# Patient Record
Sex: FEMALE | Race: WHITE | NOT HISPANIC OR LATINO
[De-identification: names, ages, dates, MRNs, and addresses within clinical notes are randomized per-mention and may not be internally consistent; named-entity substitution may affect disease eponyms.]

---

## 2018-11-13 ENCOUNTER — HEALTH MAINTENANCE LETTER (OUTPATIENT)
Age: 28
End: 2018-11-13

## 2018-12-26 ENCOUNTER — OFFICE VISIT - RIVER FALLS (OUTPATIENT)
Dept: FAMILY MEDICINE | Facility: CLINIC | Age: 28
End: 2018-12-26

## 2018-12-26 ASSESSMENT — MIFFLIN-ST. JEOR: SCORE: 1215.77

## 2019-03-19 ENCOUNTER — OFFICE VISIT - RIVER FALLS (OUTPATIENT)
Dept: FAMILY MEDICINE | Facility: CLINIC | Age: 29
End: 2019-03-19

## 2019-03-19 LAB — DEPRECATED S PYO AG THROAT QL EIA: POSITIVE

## 2019-03-19 ASSESSMENT — MIFFLIN-ST. JEOR: SCORE: 1220.3

## 2019-07-24 ENCOUNTER — OFFICE VISIT - RIVER FALLS (OUTPATIENT)
Dept: FAMILY MEDICINE | Facility: CLINIC | Age: 29
End: 2019-07-24

## 2019-07-24 LAB — DEPRECATED S PYO AG THROAT QL EIA: DETECTED

## 2019-07-24 ASSESSMENT — MIFFLIN-ST. JEOR: SCORE: 1212.14

## 2019-10-01 ENCOUNTER — OFFICE VISIT - RIVER FALLS (OUTPATIENT)
Dept: FAMILY MEDICINE | Facility: CLINIC | Age: 29
End: 2019-10-01

## 2019-10-01 ASSESSMENT — MIFFLIN-ST. JEOR: SCORE: 1222.56

## 2019-10-02 ENCOUNTER — COMMUNICATION - RIVER FALLS (OUTPATIENT)
Dept: FAMILY MEDICINE | Facility: CLINIC | Age: 29
End: 2019-10-02

## 2019-10-02 LAB
MEV IGG SER IA-ACNC: 49.9
MUV IGG SER QL IA: 70.2
RUBELLA IMMUNITY: 3.77
VZV IGG SER QL IA: 1144

## 2019-11-25 ENCOUNTER — AMBULATORY - RIVER FALLS (OUTPATIENT)
Dept: FAMILY MEDICINE | Facility: CLINIC | Age: 29
End: 2019-11-25

## 2022-02-12 VITALS
TEMPERATURE: 98.3 F | BODY MASS INDEX: 20.23 KG/M2 | WEIGHT: 114.2 LBS | SYSTOLIC BLOOD PRESSURE: 90 MMHG | HEART RATE: 76 BPM | DIASTOLIC BLOOD PRESSURE: 62 MMHG | HEIGHT: 63 IN

## 2022-02-12 VITALS
SYSTOLIC BLOOD PRESSURE: 126 MMHG | BODY MASS INDEX: 19.29 KG/M2 | WEIGHT: 113 LBS | HEIGHT: 64 IN | DIASTOLIC BLOOD PRESSURE: 62 MMHG | HEART RATE: 74 BPM

## 2022-02-12 VITALS
HEIGHT: 63 IN | TEMPERATURE: 98.1 F | WEIGHT: 115 LBS | DIASTOLIC BLOOD PRESSURE: 60 MMHG | BODY MASS INDEX: 20.38 KG/M2 | SYSTOLIC BLOOD PRESSURE: 90 MMHG | HEART RATE: 80 BPM

## 2022-02-12 VITALS
DIASTOLIC BLOOD PRESSURE: 60 MMHG | BODY MASS INDEX: 20.55 KG/M2 | SYSTOLIC BLOOD PRESSURE: 102 MMHG | WEIGHT: 116 LBS | TEMPERATURE: 99.4 F | HEART RATE: 80 BPM | HEIGHT: 63 IN

## 2022-02-15 NOTE — PROCEDURES
Accession Number:       [id number]  CLINICAL INFORMATION::     None given  LMP::     [id number]  PREV. PAP::     2012  PREV. BX::     2012  SOURCE::     Cervix, Endocervix  STATEMENT OF ADEQUACY::     Satisfactory for evaluation. Endocervical/transformation zone component present.  INTERPRETATION/RESULT::     Negative for intraepithelial lesion or malignancy.  COMMENT::     This Pap test has been evaluated with computer assisted technology.  CYTOTECHNOLOGIST::     RENALDO MUELLER(ASCP) CT Screening location: [place] [id number]  COMMENT:     See comment       EXPLANATORY NOTE:         The Pap is a screening test for cervical cancer. It is       not a diagnostic test and is subject to false negative       and false positive results. It is most reliable when a       satisfactory sample, regularly obtained, is submitted       with relevant clinical findings and history, and when       the Pap result is evaluated along with historic and       current clinical information.

## 2022-02-15 NOTE — PROGRESS NOTES
Patient:   MARYA GAGE            MRN: [id number]            FIN: [id number]               Age:   29 years     Sex:  Female     :  1990   Associated Diagnoses:   Immunity status testing; School physical exam   Author:   Nikita Rosenberg MD      Visit Information      Date of Service: 10/01/2019 04:00 pm  Performing Location: [facility]  Encounter#: [id number]      Primary Care Provider (PCP):  Eric Knox MD    NPI# [id number]      Referring Provider:  Nikita Rosenberg MD    NPI# [id number]      Chief Complaint   10/1/2019 4:06 PM CDT    Needs Physical done for Nursing school        History of Present Illness             The patient presents for a general exam.  The patient's general health status is described as good.  The patient's diet is described as balanced.  Exercise: occasional.  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  Additional pertinent history: tobacco use .5 pack(s)/day.        Review of Systems   Constitutional:  No fever, No chills.    Eye   Ear/Nose/Mouth/Throat:  No nasal congestion, No sore throat.    Respiratory:  No shortness of breath, No cough.    Cardiovascular   Breast   Gastrointestinal:  No nausea, No vomiting, No diarrhea, No constipation.    Genitourinary:  No dysuria.    Gynecologic   Hematology/Lymphatics:  No bruising tendency, No swollen lymph glands.    Endocrine   Immunologic:  No recurrent fevers, No recurrent infections.    Musculoskeletal:  No muscle pain.    Integumentary:  No rash.    Neurologic:  No tingling, No headache.    Psychiatric   All other systems.     Health Status   Allergies:    Allergic Reactions (Selected)  No Known Medication Allergies   Medications:    Medications          No Known Home Medications     Problem list:    All Problems (Selected)  No Chronic Problems / NKP      Histories   Past Medical History:    Resolved  Pregnancy ([id number]):  Resolved on 2016 at 25 years.   Family History:     Alcoholism  Father  Depression  Father     Procedure history:    No active procedure history items have been selected or recorded.   Social History:        Alcohol Assessment            Current, Beer (12 oz), Wine (5 oz), 1-2 times per week, 3 drinks/episode average.      Tobacco Assessment            Former smoker, quit more than 30 days ago, Cigarettes, 15 per day.  5 year(s).      Substance Abuse Assessment            Never      Employment and Education Assessment            Employed, Work/School description: CNA.      Home and Environment Assessment            Marital status: Single.      Nutrition and Health Assessment            Type of diet: Regular.      Exercise and Physical Activity Assessment            Exercise frequency: occasional.  Exercise type: Running.      Sexual Assessment            Sexually active: No.  Sexual orientation: Straight or heterosexual.  Uses condoms: Yes.      Other Assessment            First menses age 15.  Irregular menses.  Menstrual duration 7 days.        Physical Examination   Vital Signs   10/1/2019 4:06 PM CDT Peripheral Pulse Rate 74 bpm    Systolic Blood Pressure 126 mmHg    Diastolic Blood Pressure 62 mmHg    Mean Arterial Pressure 83 mmHg      Measurements from flowsheet : Measurements   10/1/2019 4:06 PM CDT Height Measured - Standard 64.00 in    Weight Measured - Standard 113 lb    BSA 1.52 m2    Body Mass Index 19.39 kg/m2      General:  Alert and oriented, No acute distress.    Eye:  Pupils are equal, round and reactive to light, Normal conjunctiva.    HENT:  Oral mucosa is moist.    Neck:  Supple, No lymphadenopathy.    Respiratory:  Lungs are clear to auscultation, Respirations are non-labored.    Cardiovascular:  Normal rate, Regular rhythm, Normal peripheral perfusion, No edema.    Gastrointestinal:  Non-distended.    Musculoskeletal:  Normal gait.    Integumentary:  Warm, No rash.    Psychiatric:  Cooperative, Appropriate mood & affect, Normal judgment.        Review / Management   Results review:  Lab results: 7/24/2019 12:04 PM CDT   Group A Strep POC         DETECTED  .       Impression and Plan   Diagnosis     Immunity status testing (YNJ50-RN Z01.84).     School physical exam (JVS31-RF Z02.0).     Orders     Orders (Selected)   Outpatient Orders  Ordered  Return to Clinic (Request): RFV: 2nd Hep B vaccine, Return in 4 weeks  Return to Clinic (Request): RFV: 3rd Hep B vaccine, Return in 6 months  Ordered (In Transit)  Measles antibody (IgG)* ([facility]): Specimen Type: Serum, Collection Date: 10/01/19 16:27:00 CDT  Mumps virus antibody (igg)* ([facility]): Specimen Type: Serum, Collection Date: 10/01/19 16:27:00 CDT  Rubella immune status* ([facility]): Specimen Type: Serum, Collection Date: 10/01/19 16:27:00 CDT  Varicella zoster virus ab (IgG)* ([facility]): Specimen Type: Serum, Collection Date: 10/01/19 16:27:00 CDT.     reviewed exercise and diet   reviewed health maintence and encouraged completion  questions were answered regarding health, medicines and future expectations.     no concerns with starting nursing school.

## 2022-02-15 NOTE — LETTER
(Inserted Image. Unable to display)   October 02, 2019        MAYRA GAGE      [address]  [place], [place] [id number]        Dear MAYRA,    Thank you for choosing [facility] for your healthcare needs. Below you will find the results of your recent test(s) done at our clinic.      Your titers show good immune status on all      Result Name Current Result   Mumps Ab IgG  70.20 10/1/2019   Rubella Immune Status  3.77 10/1/2019   Measles Ab IgG  49.90 10/1/2019   Varicella zoster IgG  1,144.00 10/1/2019       Please contact me or my assistant at [phone] if you have any questions or concerns.     Sincerely,        Nikita Rosenberg MD        What do your labs mean?  Below is a glossary of commonly ordered labs:  LDL   Bad Cholesterol   HDL   Good Cholesterol  AST/ALT   Liver Function   Cr/Creatinine   Kidney Function  Microalbumin   Kidney Function  BUN   Kidney Function  PSA   Prostate    TSH   Thyroid Hormone  HgbA1c   Diabetes Test   Hgb (Hemoglobin)   Red Blood Cells

## 2022-02-15 NOTE — NURSING NOTE
Depression Screening Entered On:  10/2/2019 1:10 PM CDT    Performed On:  10/2/2019 1:10 PM CDT by Sierra Turner CMA               Depression Screening   Little Interest - Pleasure in Activities :   Not at all   Feeling Down, Depressed, Hopeless :   Not at all   Initial Depression Screen Score :   0    Trouble Falling or Staying Asleep :   Not at all   Feeling Tired or Little Energy :   Several days   Poor Appetite or Overeating :   Not at all   Feeling Bad About Yourself :   Not at all   Trouble Concentrating :   Not at all   Moving or Speaking Slowly :   Not at all   Thoughts Better Off Dead or Hurting Self :   Not at all   Detailed Depression Screen Score :   1    Total Depression Screen Score :   1    BLAISE Difficulty with Work, Home, Others :   Not difficult at all   Sierra Turner CMA - 10/2/2019 1:10 PM CDT

## 2022-02-15 NOTE — PROGRESS NOTES
Patient:   MAYRA GAGE            MRN: [id number]            FIN: [id number]               Age:   28 years     Sex:  Female     :  1990   Associated Diagnoses:   None   Author:   Abdirahman Rivera MD       -   Today's date:    3/19/2019.        -   To whom it may concern:        This patient is currently under my care and Was seen in my office on  3/19/2019.  .     Please excuse him/ her from work, yesterday and today due to strep..  He/ she may return to on  3/20/2019, without restrictions.  Follow up as needed   Please contact me if you have any questions or concerns.      -   Sincerely,             Abdirahman Rivera MD  [address]  [address] [phone]

## 2022-02-15 NOTE — NURSING NOTE
11/19/19   1503    Pt calls and LM wanting a call back about when she is due for her second HepB vaccine.  [phone]    11/19/19   1718    Called and spoke with pt. Informed her that she is now due and can schedule a CSS only appt. (Pt also just got a reminder letter in the mail today) She is wondering if she could get a  Hep B titer drawn because she won't have the full series completed in time for school clinicals. Informed her that would require an office visit with a provider. I also suggested that she check with her school to see if as long as she is working on the series, a titer might not even be needed. She will check into this but for now will schedule her HepB #2. Pt is transferred to scheduling.

## 2022-02-15 NOTE — NURSING NOTE
Comprehensive Intake Entered On:  10/1/2019 4:11 PM CDT    Performed On:  10/1/2019 4:06 PM CDT by Sierra Turner CMA               Summary   Chief Complaint :   Needs Physical done for Nursing school   Weight Measured :   113 lb(Converted to: 113 lb 0 oz, 51.26 kg)    Height Measured :   64.00 in(Converted to: 5 ft 4 in, 162.56 cm)    Body Mass Index :   19.39 kg/m2   Body Surface Area :   1.52 m2   Systolic Blood Pressure :   126 mmHg   Diastolic Blood Pressure :   62 mmHg   Mean Arterial Pressure :   83 mmHg   Peripheral Pulse Rate :   74 bpm   Sierra Turner CMA - 10/1/2019 4:06 PM CDT   Health Status   Allergies Verified? :   Yes   Medication History Verified? :   Yes   Pre-Visit Planning Status :   Completed   Tobacco Use? :   Former smoker   Sierra Turner CMA - 10/1/2019 4:06 PM CDT   Consents   Consent for Immunization Exchange :   Consent Granted   Consent for Immunizations to Providers :   Consent Granted   Sierra Turner CMA - 10/1/2019 4:06 PM CDT   Meds / Allergies   (As Of: 10/1/2019 4:11:37 PM CDT)   Allergies (Active)   No Known Medication Allergies  Estimated Onset Date:   Unspecified ; Created By:   Mary Ellen Burdick CMA; Reaction Status:   Active ; Category:   Drug ; Substance:   No Known Medication Allergies ; Type:   Allergy ; Updated By:   Mary Ellen Burdick CMA; Reviewed Date:   3/19/2019 11:38 AM CDT        Medication List   (As Of: 10/1/2019 4:11:37 PM CDT)

## 2022-02-15 NOTE — NURSING NOTE
Comprehensive Intake Entered On:  7/24/2019 11:56 AM CDT    Performed On:  7/24/2019 11:51 AM CDT by Bianka RICHARDSON, Maryse               Summary   Chief Complaint :   c/o sore throat since Thursday,  denies fever.   Weight Measured :   114.2 lb(Converted to: 114 lb 3 oz, 51.80 kg)    Height Measured :   63 in(Converted to: 5 ft 3 in, 160.02 cm)    Body Mass Index :   20.23 kg/m2   Body Surface Area :   1.52 m2   Systolic Blood Pressure :   90 mmHg   Diastolic Blood Pressure :   62 mmHg   Mean Arterial Pressure :   71 mmHg   Peripheral Pulse Rate :   76 bpm   BP Site :   Right arm   Pulse Site :   Radial artery   BP Method :   Manual   HR Method :   Manual   Temperature Tympanic :   98.3 DegF(Converted to: 36.8 DegC)    Maryse Carlton MA - 7/24/2019 11:51 AM CDT   Health Status   Allergies Verified? :   Yes   Medication History Verified? :   Yes   Medical History Verified? :   Yes   Pre-Visit Planning Status :   Not completed   Tobacco Use? :   Former smoker   Maryse Carlton MA - 7/24/2019 11:51 AM CDT   Consents   Consent for Immunization Exchange :   Consent Granted   Consent for Immunizations to Providers :   Consent Granted   Maryse Carlton MA - 7/24/2019 11:51 AM CDT   Meds / Allergies   (As Of: 7/24/2019 11:56:14 AM CDT)   Allergies (Active)   No Known Medication Allergies  Estimated Onset Date:   Unspecified ; Created By:   Mary Ellen Burdick CMA; Reaction Status:   Active ; Category:   Drug ; Substance:   No Known Medication Allergies ; Type:   Allergy ; Updated By:   Mary Ellen Burdick CMA; Reviewed Date:   3/19/2019 11:38 AM CDT        Medication List   (As Of: 7/24/2019 11:56:14 AM CDT)   No Known Home Medications     Maryse Carlton MA - 7/24/2019 11:51:42 AM      Prescription/Discharge Order    penicillin V potassium  :   penicillin V potassium ; Status:   Completed ; Ordered As Mnemonic:   penicillin V potassium 250 mg oral tablet ; Simple Display Line:   250 mg, 1 tab(s), Oral, q8 hrs, for 10 day(s), 30 tab(s),  0 Refill(s) ; Ordering Provider:   Abdirahman Rivera MD; Catalog Code:   penicillin V potassium ; Order Dt/Tm:   3/19/2019 11:54:27 AM            Social History   Social History   (As Of: 7/24/2019 11:56:14 AM CDT)   Alcohol:  Low Risk      1-2 times per week, 2 drinks/episode average.   (Last Updated: 12/27/2018 9:50:11 AM CST by Sonja Acuña)          Tobacco:  Past      (Last Updated: 12/26/2018 3:16:26 PM CST by Eric Knox MD )         Substance Abuse:  Denies Substance Abuse      Never   (Last Updated: 12/27/2018 9:50:17 AM CST by Sonja Acuña)          Employment/School:        Employed, Work/School description: CNA.   (Last Updated: 12/26/2018 3:16:45 PM CST by Eric Knox MD)          Nutrition/Health:        Type of diet: Regular.   (Last Updated: 12/27/2018 9:51:00 AM CST by Sonja Acuña)          Exercise:  Does not exercise      (Last Updated: 12/27/2018 9:50:55 AM CST by Sonja Acuña )         Sexual:        Sexually active: Yes.  Sexual orientation: Straight or heterosexual.  Uses condoms: Yes.   (Last Updated: 12/27/2018 9:49:48 AM CST by Sonja Acuña)          Other:        First menses age 15.  Irregular menses.  Menstrual duration 7 days.   (Last Updated: 12/27/2018 9:48:58 AM CST by Sonja Acuña)

## 2022-02-15 NOTE — LETTER
(Inserted Image. Unable to display)     April 06, 2020      MAYRA GAGE  [address] [place], [place] [id number]          Dear MAYRA,      Thank you for selecting [facility] ([facility] for your healthcare needs.      Our records indicate you are due for the following services:     Immunizations:  Hep B #3.      To schedule an appointment or if you have further questions, please contact your primary clinic:   [facility]       [phone]   [facility]       [phone]              [facility]     [phone]      Powered by [employer]    Sincerely,    Nikita Rosenberg MD

## 2022-02-15 NOTE — TELEPHONE ENCOUNTER
---------------------  From: Sierra Turner CMA   Sent: 10/7/2019 2:10:10 PM CDT  Subject: General Message     Pt was called and informed that her ppw is at the  for her to . I also informed her that her titers came back showing good immunity so she doesn't have to worry about getting updated boosters.

## 2022-02-15 NOTE — LETTER
(Inserted Image. Unable to display)     November 04, 2019      MAYRA GAGE  [address]  [place], [place] [id number]          Dear MAYRA,      Thank you for selecting [facility] ([facility] for your healthcare needs.      Our records indicate you are due for the following services:     Immunizations:  Hep B #2.      To schedule an appointment or if you have further questions, please contact your primary clinic:   [facility]       [phone]   [facility]       [phone]              [facility]     [phone]      Powered by [employer]    Sincerely,    Nikita Rosenberg MD

## 2022-02-15 NOTE — NURSING NOTE
Rapid Strep POC Entered On:  3/19/2019 11:54 AM CDT    Performed On:  3/19/2019 11:53 AM CDT by Tabitha Banks CMA               Rapid Strep POC   Rapid Strep POC :   Positive   Tabitha Banks CMA - 3/19/2019 11:53 AM CDT   Details   Collection Date :   3/19/2019 11:50 AM CDT   Handling Specimen POC :   Throat   POC Test Comments :   Lab Test Performed by:   [facility]  [address] [place] [address]  Phone: [phone]  Fax: [phone]     Tabitha Banks CMA - 3/19/2019 11:53 AM CDT

## 2022-02-15 NOTE — PROGRESS NOTES
Patient:   MAYRA GAGE            MRN: [id number]            FIN: [id number]               Age:   28 years     Sex:  Female     :  1990   Associated Diagnoses:   Vaginitis; Screening for cervical cancer; Well adult exam   Author:   Eric Knox MD      Visit Information   Visit type:  Annual exam.    Source of history:  Self.    History limitation:  None.       Chief Complaint   2018 3:02 PM CST   New patient; Establish Care; Physical; possible yeast infection      Well Adult History   Well Adult History             The patient presents for well adult exam.  The patient's general health status is described as good and Had normal pregnancy 2 years ago. Otherwise, minimal health care required as an adult, one prior pap smear from her recollection. Has noted some vaginal irritation. Slight discharge. Would like to be screened for STDs as well. Notes new sex partner.  The patient's diet is described as balanced.  Exercise: routine.  Additional pertinent history: in in school for nursing, works part time as CNA at [employer].        Review of Systems   ROS reviewed as documented in chart      Health Status   Allergies:    Allergic Reactions (Selected)  No Known Medication Allergies   Medications:  (Selected)   , no daily medications   Problem list:    All Problems  No Chronic Problems / Cerner NKP      Histories   Past Medical History:    No active or resolved past medical history items have been selected or recorded.   Family History:    Mother    History is negative.  Father    History is negative.  Sister    History is negative.  Brother    History is negative.     Procedure history:    No active procedure history items have been selected or recorded., none   Social History:        Alcohol Assessment: Low Risk      Tobacco Assessment: Past      Substance Abuse Assessment: Denies Substance Abuse      Employment and Education Assessment            Employed, Work/School description: CNA.      Gynecologic  history   Pregnancy status:  1, para 1.      Physical Examination   Last Menstrual Period: 12/3/2018   Vital Signs   2018 3:02 PM CST Temperature Tympanic 98.1 DegF    Peripheral Pulse Rate 80 bpm    Pulse Site Radial artery    HR Method Manual    Systolic Blood Pressure 90 mmHg    Diastolic Blood Pressure 60 mmHg    Mean Arterial Pressure 70 mmHg    BP Site Right arm    BP Method Manual      Measurements from flowsheet : Measurements   2018 3:02 PM CST Height Measured - Standard 63 in    Weight Measured - Standard 115 lb    BSA 1.52 m2    Body Mass Index 20.37 kg/m2      General:  Alert and oriented, No acute distress.    Eye:  Pupils are equal, round and reactive to light, Extraocular movements are intact, Normal conjunctiva.    HENT:  Normocephalic, Tympanic membranes are clear, Oral mucosa is moist, No pharyngeal erythema.    Neck:  Supple, Non-tender, No lymphadenopathy, No thyromegaly.    Respiratory:  Lungs are clear to auscultation.    Cardiovascular:  Normal rate, Regular rhythm.    Breast:  No mass, No tenderness, No discharge.    Gastrointestinal:  Soft, Non-tender, Non-distended, No organomegaly.    Genitourinary:  Normal genitalia for age and sex, No urethral discharge, No lesions.         Perineum: Within normal limits.         Vagina: Within normal limits.         Uterus: Within normal limits.         Ovaries: Within normal limits.         Adnexa: Within normal limits.    Musculoskeletal:  Normal range of motion, Normal strength.    Integumentary:  Warm, Dry, Pink, No rash, no concerning lesions.    Neurologic:  Alert, Oriented, Normal sensory.    Psychiatric:  Cooperative, Appropriate mood & affect.       Impression and Plan   Diagnosis     Vaginitis (YMC43-IR N76.0).     Screening for cervical cancer (OKO26-KW Z12.4).     Well adult exam (QKV12-IP Z00.00).     Course:  Progressing as expected.    Plan:  will call with results tomorrow once they are back.    Patient Instructions:        Counseled: Patient, BMI, diet, and exercise.    Orders     Orders (Selected)   Outpatient Orders  Ordered  Return to Clinic (Request): RFV: Annual px, Return in 1 year  Ordered (In Transit)  BV/Vaginitis Panel DNA Probe* ([facility]): Specimen Type: Vaginal, Collection Date: 12/26/18 15:33:00 CST  Chlamydia/Neisseria gonorrhoeae RNA, TMA* ([facility]): Specimen Type: Swab, Collection Date: 12/26/18 15:33:00 CST  ThinPrep Imaging Pap reflex HPV mRNA E6/E7* ([facility]): Specimen Type: Pap, Collection Date: 12/26/18 15:48:00 CST.

## 2022-02-15 NOTE — NURSING NOTE
Comprehensive Intake Entered On:  3/19/2019 11:41 AM CDT    Performed On:  3/19/2019 11:36 AM CDT by Fallon White MA               Summary   Chief Complaint :   Sore throat, swelling, had fever yesterday    Weight Measured :   116 lb(Converted to: 116 lb 0 oz, 52.62 kg)    Height Measured :   63 in(Converted to: 5 ft 3 in, 160.02 cm)    Body Mass Index :   20.55 kg/m2   Body Surface Area :   1.53 m2   Systolic Blood Pressure :   102 mmHg   Diastolic Blood Pressure :   60 mmHg   Mean Arterial Pressure :   74 mmHg   Peripheral Pulse Rate :   80 bpm   BP Site :   Right arm   Pulse Site :   Radial artery   BP Method :   Manual   HR Method :   Manual   Temperature Tympanic :   99.4 DegF(Converted to: 37.4 DegC)    Fallon White MA - 3/19/2019 11:36 AM CDT   Health Status   Allergies Verified? :   Yes   Medication History Verified? :   Yes   Medical History Verified? :   Yes   Pre-Visit Planning Status :   N/A   Tobacco Use? :   Former smoker   Fallon White MA - 3/19/2019 11:36 AM CDT   Consents   Consent for Immunization Exchange :   Consent Granted   Consent for Immunizations to Providers :   Consent Granted   Fallon White MA - 3/19/2019 11:36 AM CDT   Meds / Allergies   (As Of: 3/19/2019 11:41:28 AM CDT)   Allergies (Active)   No Known Medication Allergies  Estimated Onset Date:   Unspecified ; Created By:   Mary Ellen Burdick CMA; Reaction Status:   Active ; Category:   Drug ; Substance:   No Known Medication Allergies ; Type:   Allergy ; Updated By:   Mary Ellen Burdick CMA; Reviewed Date:   3/19/2019 11:38 AM CDT        Medication List   (As Of: 3/19/2019 11:41:28 AM CDT)   No Known Home Medications     Fallon White MA - 3/19/2019 11:38:41 AM

## 2022-02-15 NOTE — PROGRESS NOTES
Patient:   MAYRA GAGE            MRN: [id number]            FIN: [id number]               Age:   28 years     Sex:  Female     :  1990   Associated Diagnoses:   Acute streptococcal pharyngitis   Author:   Abdirahman Rivera MD      Chief Complaint   3/19/2019 11:36 AM CDT   Sore throat, swelling, had fever yesterday        History of Present Illness             The patient presents with a sore throat.  The sore throat is described as burning and scratchy.  The severity of the sore throat is moderate.  The timing/course of the sore throat is constant.  The sore throat has lasted for 2 day(s).  The context of the sore throat: occurred in association with illness.  Exacerbating factors consist of speaking.  Relieving factors consist of rest and fluids.  Associated symptoms consist of fever, denies chills, denies cough, denies difficulty swallowing and denies ear pain.        Review of Systems   Ear/Nose/Mouth/Throat:  Sore throat.       Health Status   Allergies:    Allergic Reactions (Selected)  No Known Medication Allergies   Problem list:    All Problems  No Chronic Problems / Cerner NKP  Resolved: Pregnancy / SNOMED CT 112159416   Medications:  (Selected)         Histories   Past Medical History:    Resolved  Pregnancy (SNOMED CT 163646001):  Resolved on 2016 at 25 years.   Family History:       Procedure history:    No active procedure history items have been selected or recorded.   Social History:        Alcohol Assessment: Low Risk            1-2 times per week, 2 drinks/episode average.      Tobacco Assessment: Past      Substance Abuse Assessment: Denies Substance Abuse            Never      Employment and Education Assessment            Employed, Work/School description: CNA.      Nutrition and Health Assessment            Type of diet: Regular.      Exercise and Physical Activity Assessment: Does not exercise      Sexual Assessment            Sexually active: Yes.  Sexual orientation:  Straight or heterosexual.  Uses condoms: Yes.      Other Assessment            First menses age 15.  Irregular menses.  Menstrual duration 7 days.      Physical Examination   Vital Signs   3/19/2019 11:36 AM CDT Temperature Tympanic 99.4 DegF    Peripheral Pulse Rate 80 bpm    Pulse Site Radial artery    HR Method Manual    Systolic Blood Pressure 102 mmHg    Diastolic Blood Pressure 60 mmHg    Mean Arterial Pressure 74 mmHg    BP Site Right arm    BP Method Manual      Measurements from flowsheet : Measurements   3/19/2019 11:36 AM CDT Height Measured - Standard 63 in    Weight Measured - Standard 116 lb    BSA 1.53 m2    Body Mass Index 20.55 kg/m2      General:  Alert and oriented, No acute distress.    Eye:  Normal conjunctiva.    HENT:  Normocephalic, Tympanic membranes are clear, Oral mucosa is moist, Beefy red posterior pharynx, with posterior cobblestoning.    Neck:  Supple, No thyromegaly, Benign reactive lymphadenopathy.    Respiratory:  Lungs are clear to auscultation, Breath sounds are equal, Symmetrical chest wall expansion.         Respirations: Are within normal limits.         Pattern: Regular.         Breath sounds: Bilateral, Within normal limits.    Cardiovascular:  Normal rate, Regular rhythm, No murmur, Good pulses equal in all extremities, Normal peripheral perfusion, No edema.    Gastrointestinal:  Soft, Non-tender, Non-distended, Normal bowel sounds, No organomegaly.    Integumentary:  Warm, Dry, No rash.    Neurologic:  Alert, Oriented.    Psychiatric:  Cooperative.       Review / Management   Results review:  Strep positive.       Impression and Plan   Diagnosis     Acute streptococcal pharyngitis (YSJ40-UM J02.0).     Plan   Patient Instructions:       Counseled: Patient, Regarding diagnosis, Regarding medications, Activity, Verbalized understanding.    Orders     Orders   Pharmacy:  penicillin V potassium 250 mg oral tablet (Prescribe): = 1 tab(s) ( 250 mg ), Oral, q8 hrs, x 10 day(s), #  30 tab(s), 0 Refill(s), Type: Maintenance, Pharmacy: [facility] [id number], 1 tab(s) Oral q8 hrs,x10 day(s).     Push fluids, tylenol or ibuprofen for fever..

## 2023-12-06 NOTE — PROGRESS NOTES
Chief Complaint    c/o sore throat since Thursday,  denies fever.  History of Present Illness      Here with sore throat for the last week,  concerned about strep  Review of Systems          ROS reviewed an negative except for symptoms noted in HPI.            Physical Exam   Vitals & Measurements    T: 98.3   F (Tympanic)  HR: 76(Peripheral)  BP: 90/62     HT: 63 in  WT: 114.2 lb  BMI: 20.23           General:  Alert and oriented, No acute distress.            HENT:  Normocephalic, Tympanic membranes are clear, Normal hearing, Oral mucosa is moist.                 Throat: Tonsils ( Within normal limits ), Pharynx ( Not edematous, Erythematous, exudate ).            Neck:  anterior cervical adenopathy.            Respiratory:  Lungs are clear to auscultation, Respirations are non-labored.            Cardiovascular:  Normal rate, Regular rhythm.            Integumentary:  Warm, Dry, No rash.      rapid strep is positive  Assessment/Plan       1. Sore throat (J02.9)         treat strep with penicillin        Analgesics and antipyretics as needed, symptomatic care, and follow up if not improving                Strep throat (J02.0)         Ordered:          amoxicillin, = 1 tab(s) ( 875 mg ), Oral, bid, x 10 day(s), # 20 tab(s), 0 Refill(s), Type: Acute, Pharmacy: [id number], 1 tab(s) Oral bid,x10 day(s), (Ordered)                Orders:         POC, GROUP A STREP* ([facility]), Specimen Type: Swab, Collection Date: 07/24/19 11:56:00 CDT  Patient Information     Name:MAYRA GAGE      Address:      [address] [place] [address] [address] [place] [id number]     Sex:Female     YOB: 1990     [phone]     Emergency Contact:KARTHIKEYAN GAGE     MRN:[id number]     FIN:[id number]     Location:[facility]     Date of Service:07/24/2019      Primary Care Physician:       Eric Knox MD, [phone]      Attending Physician:       Carlos Bills MD, [phone]  Patient called.  Was seen on 12/5/23 and colon cancer screening was discussed.  Patient want to cancel Cologuard and have a colonoscopy.  Order leticia'd up.     Please advise and cancel cologuard order if appropriate      [phone]  Problem List/Past Medical History    Ongoing     No chronic problems    Historical     Pregnancy  Medications     amoxicillin 875 mg oral tablet: 875 mg, 1 tab(s), Oral, bid, for 10 day(s), 20 tab(s), 0 Refill(s).      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 Irregular menses. Menstrual duration 7 days., 12/27/2018     Sexual      Sexually active: Yes. Sexual orientation: Straight or heterosexual. Uses condoms: Yes., 12/27/2018     Substance Abuse - Denies Substance Abuse, 12/26/2018      Never, 12/27/2018     Tobacco - Past, 12/26/2018  Family History    Mother: History is negative    Father: History is negative    Sister: History is negative    Brother: History is negative  Lab Results       Lab Results (Last 4 results within 90 days)        Group A Strep POC: DETECTED Abnormal (07/24/19 12:04:00)